# Patient Record
Sex: MALE | Race: OTHER | Employment: UNEMPLOYED | URBAN - METROPOLITAN AREA
[De-identification: names, ages, dates, MRNs, and addresses within clinical notes are randomized per-mention and may not be internally consistent; named-entity substitution may affect disease eponyms.]

---

## 2017-07-16 ENCOUNTER — HOSPITAL ENCOUNTER (EMERGENCY)
Age: 22
Discharge: HOME | End: 2017-07-16
Attending: EMERGENCY MEDICINE
Payer: SELF-PAY

## 2017-07-16 ENCOUNTER — APPOINTMENT (OUTPATIENT)
Dept: GENERAL RADIOLOGY | Age: 22
End: 2017-07-16
Attending: EMERGENCY MEDICINE
Payer: SELF-PAY

## 2017-07-16 VITALS
DIASTOLIC BLOOD PRESSURE: 101 MMHG | HEIGHT: 64 IN | BODY MASS INDEX: 19.97 KG/M2 | SYSTOLIC BLOOD PRESSURE: 129 MMHG | HEART RATE: 59 BPM | WEIGHT: 117 LBS | OXYGEN SATURATION: 99 % | RESPIRATION RATE: 14 BRPM

## 2017-07-16 DIAGNOSIS — K59.00 CONSTIPATION, UNSPECIFIED CONSTIPATION TYPE: Primary | ICD-10-CM

## 2017-07-16 LAB
ALBUMIN SERPL BCP-MCNC: 4.2 G/DL (ref 3.5–5)
ALBUMIN/GLOB SERPL: 1.3 {RATIO} (ref 1.1–2.2)
ALP SERPL-CCNC: 66 U/L (ref 45–117)
ALT SERPL-CCNC: 21 U/L (ref 12–78)
ANION GAP BLD CALC-SCNC: 6 MMOL/L (ref 5–15)
APPEARANCE UR: CLEAR
AST SERPL W P-5'-P-CCNC: 20 U/L (ref 15–37)
BACTERIA URNS QL MICRO: NEGATIVE /HPF
BASOPHILS # BLD AUTO: 0 K/UL (ref 0–0.1)
BASOPHILS # BLD: 0 % (ref 0–1)
BILIRUB SERPL-MCNC: 1.3 MG/DL (ref 0.2–1)
BILIRUB UR QL: NEGATIVE
BUN SERPL-MCNC: 12 MG/DL (ref 6–20)
BUN/CREAT SERPL: 15 (ref 12–20)
CALCIUM SERPL-MCNC: 8.5 MG/DL (ref 8.5–10.1)
CHLORIDE SERPL-SCNC: 104 MMOL/L (ref 97–108)
CO2 SERPL-SCNC: 28 MMOL/L (ref 21–32)
COLOR UR: NORMAL
CREAT SERPL-MCNC: 0.79 MG/DL (ref 0.7–1.3)
EOSINOPHIL # BLD: 0.1 K/UL (ref 0–0.4)
EOSINOPHIL NFR BLD: 1 % (ref 0–7)
EPITH CASTS URNS QL MICRO: NORMAL /LPF
ERYTHROCYTE [DISTWIDTH] IN BLOOD BY AUTOMATED COUNT: 12.7 % (ref 11.5–14.5)
GLOBULIN SER CALC-MCNC: 3.3 G/DL (ref 2–4)
GLUCOSE SERPL-MCNC: 85 MG/DL (ref 65–100)
GLUCOSE UR STRIP.AUTO-MCNC: NEGATIVE MG/DL
HCT VFR BLD AUTO: 44.3 % (ref 36.6–50.3)
HGB BLD-MCNC: 15.3 G/DL (ref 12.1–17)
HGB UR QL STRIP: NEGATIVE
HYALINE CASTS URNS QL MICRO: NORMAL /LPF (ref 0–5)
KETONES UR QL STRIP.AUTO: NEGATIVE MG/DL
LEUKOCYTE ESTERASE UR QL STRIP.AUTO: NEGATIVE
LIPASE SERPL-CCNC: 106 U/L (ref 73–393)
LYMPHOCYTES # BLD AUTO: 32 % (ref 12–49)
LYMPHOCYTES # BLD: 1.8 K/UL (ref 0.8–3.5)
MCH RBC QN AUTO: 30.2 PG (ref 26–34)
MCHC RBC AUTO-ENTMCNC: 34.5 G/DL (ref 30–36.5)
MCV RBC AUTO: 87.5 FL (ref 80–99)
MONOCYTES # BLD: 0.4 K/UL (ref 0–1)
MONOCYTES NFR BLD AUTO: 7 % (ref 5–13)
NEUTS SEG # BLD: 3.3 K/UL (ref 1.8–8)
NEUTS SEG NFR BLD AUTO: 60 % (ref 32–75)
NITRITE UR QL STRIP.AUTO: NEGATIVE
PH UR STRIP: 7 [PH] (ref 5–8)
PLATELET # BLD AUTO: 193 K/UL (ref 150–400)
POTASSIUM SERPL-SCNC: 4 MMOL/L (ref 3.5–5.1)
PROT SERPL-MCNC: 7.5 G/DL (ref 6.4–8.2)
PROT UR STRIP-MCNC: NEGATIVE MG/DL
RBC # BLD AUTO: 5.06 M/UL (ref 4.1–5.7)
RBC #/AREA URNS HPF: NORMAL /HPF (ref 0–5)
SODIUM SERPL-SCNC: 138 MMOL/L (ref 136–145)
SP GR UR REFRACTOMETRY: 1.02 (ref 1–1.03)
UROBILINOGEN UR QL STRIP.AUTO: 0.2 EU/DL (ref 0.2–1)
WBC # BLD AUTO: 5.5 K/UL (ref 4.1–11.1)
WBC URNS QL MICRO: NORMAL /HPF (ref 0–4)

## 2017-07-16 PROCEDURE — 83690 ASSAY OF LIPASE: CPT | Performed by: EMERGENCY MEDICINE

## 2017-07-16 PROCEDURE — 85025 COMPLETE CBC W/AUTO DIFF WBC: CPT | Performed by: EMERGENCY MEDICINE

## 2017-07-16 PROCEDURE — 36415 COLL VENOUS BLD VENIPUNCTURE: CPT | Performed by: EMERGENCY MEDICINE

## 2017-07-16 PROCEDURE — 74022 RADEX COMPL AQT ABD SERIES: CPT

## 2017-07-16 PROCEDURE — 81001 URINALYSIS AUTO W/SCOPE: CPT | Performed by: EMERGENCY MEDICINE

## 2017-07-16 PROCEDURE — 80053 COMPREHEN METABOLIC PANEL: CPT | Performed by: EMERGENCY MEDICINE

## 2017-07-16 PROCEDURE — 99283 EMERGENCY DEPT VISIT LOW MDM: CPT

## 2017-07-16 RX ORDER — DOCUSATE SODIUM 100 MG/1
100 CAPSULE, LIQUID FILLED ORAL 2 TIMES DAILY
Qty: 60 CAP | Refills: 0 | Status: SHIPPED | OUTPATIENT
Start: 2017-07-16 | End: 2017-08-15

## 2017-07-16 RX ORDER — MAGNESIUM CITRATE
296 SOLUTION, ORAL ORAL
Qty: 1 BOTTLE | Refills: 0 | Status: SHIPPED | OUTPATIENT
Start: 2017-07-16 | End: 2017-07-16

## 2017-07-16 NOTE — DISCHARGE INSTRUCTIONS
We hope that we have addressed all of your medical concerns. The examination and treatment you received in the Emergency Department were for an emergent problem and were not intended as complete care. It is important that you follow up with your healthcare provider(s) for ongoing care. If your symptoms worsen or do not improve as expected, and you are unable to reach your usual health care provider(s), you should return to the Emergency Department. Today's healthcare is undergoing tremendous change, and patient satisfaction surveys are one of the many tools to assess the quality of medical care. You may receive a survey from the [employer] organization regarding your experience in the Emergency Department. I hope that your experience has been completely positive, particularly the medical care that I provided. As such, please participate in the survey; anything less than excellent does not meet my expectations or intentions. [facility] and [facility] participate in nationally recognized quality of care measures. If your blood pressure is greater than 120/80, as reported below, we urge that you seek medical care to address the potential of high blood pressure, commonly known as hypertension. Hypertension can be hereditary or can be caused by certain medical conditions, pain, stress, or \"white coat syndrome. \"       Please make an appointment with your health care provider(s) for follow up of your Emergency Department visit. VITALS:   Patient Vitals for the past 8 hrs:   Pulse Resp BP SpO2   07/16/17 1154 (!) 59 14 136/89 99 %          Thank you for allowing us to provide you with medical care today. We realize that you have many choices for your emergency care needs. Please choose us in the future for any continued health care needs.       Elly Franco MD    [facility]   Office: [phone]            Recent Results (from the past 24 hour(s))   CBC WITH AUTOMATED DIFF    Collection Time: 07/16/17 12:52 PM   Result Value Ref Range    WBC 5.5 4.1 - 11.1 K/uL    RBC 5.06 4.10 - 5.70 M/uL    HGB 15.3 12.1 - 17.0 g/dL    HCT 44.3 36.6 - 50.3 %    MCV 87.5 80.0 - 99.0 FL    MCH 30.2 26.0 - 34.0 PG    MCHC 34.5 30.0 - 36.5 g/dL    RDW 12.7 11.5 - 14.5 %    PLATELET 408 985 - 106 K/uL    NEUTROPHILS 60 32 - 75 %    LYMPHOCYTES 32 12 - 49 %    MONOCYTES 7 5 - 13 %    EOSINOPHILS 1 0 - 7 %    BASOPHILS 0 0 - 1 %    ABS. NEUTROPHILS 3.3 1.8 - 8.0 K/UL    ABS. LYMPHOCYTES 1.8 0.8 - 3.5 K/UL    ABS. MONOCYTES 0.4 0.0 - 1.0 K/UL    ABS. EOSINOPHILS 0.1 0.0 - 0.4 K/UL    ABS. BASOPHILS 0.0 0.0 - 0.1 K/UL   METABOLIC PANEL, COMPREHENSIVE    Collection Time: 07/16/17 12:52 PM   Result Value Ref Range    Sodium 138 136 - 145 mmol/L    Potassium 4.0 3.5 - 5.1 mmol/L    Chloride 104 97 - 108 mmol/L    CO2 28 21 - 32 mmol/L    Anion gap 6 5 - 15 mmol/L    Glucose 85 65 - 100 mg/dL    BUN 12 6 - 20 MG/DL    Creatinine 0.79 0.70 - 1.30 MG/DL    BUN/Creatinine ratio 15 12 - 20      GFR est AA >60 >60 ml/min/1.73m2    GFR est non-AA >60 >60 ml/min/1.73m2    Calcium 8.5 8.5 - 10.1 MG/DL    Bilirubin, total 1.3 (H) 0.2 - 1.0 MG/DL    ALT (SGPT) 21 12 - 78 U/L    AST (SGOT) 20 15 - 37 U/L    Alk.  phosphatase 66 45 - 117 U/L    Protein, total 7.5 6.4 - 8.2 g/dL    Albumin 4.2 3.5 - 5.0 g/dL    Globulin 3.3 2.0 - 4.0 g/dL    A-G Ratio 1.3 1.1 - 2.2     LIPASE    Collection Time: 07/16/17 12:52 PM   Result Value Ref Range    Lipase 106 73 - 393 U/L   URINALYSIS W/MICROSCOPIC    Collection Time: 07/16/17 12:52 PM   Result Value Ref Range    Color YELLOW/STRAW      Appearance CLEAR CLEAR      Specific gravity 1.025 1.003 - 1.030      pH (UA) 7.0 5.0 - 8.0      Protein NEGATIVE  NEG mg/dL    Glucose NEGATIVE  NEG mg/dL    Ketone NEGATIVE  NEG mg/dL    Bilirubin NEGATIVE  NEG      Blood NEGATIVE  NEG      Urobilinogen 0.2 0.2 - 1.0 EU/dL    Nitrites NEGATIVE  NEG      Leukocyte Esterase NEGATIVE  NEG      WBC 0-4 0 - 4 /hpf    RBC 0-5 0 - 5 /hpf    Epithelial cells FEW FEW /lpf    Bacteria NEGATIVE  NEG /hpf    Hyaline cast 0-2 0 - 5 /lpf       Xr Abd Acute W 1 V Chest    Result Date: 7/16/2017  EXAM:  XR ABD ACUTE W 1 V CHEST INDICATION: Left lower quadrant abdominal pain and palpable masses. COMPARISON: None. TECHNIQUE: Erect chest and abdomen and supine abdomen radiographs (acute abdominal series). FINDINGS: The cardiomediastinal and hilar contours are within normal limits. The lungs and pleural spaces are clear. No free air under the diaphragm. No bowel obstruction. Mild colonic fecal stasis. No evidence of free intraperitoneal air. No calcification projected over the kidneys. The osseous structures are age appropriate. IMPRESSION: No bowel obstruction. Mild colonic fecal stasis. Estreñimiento: Instrucciones de cuidado - [ Constipation: Care Instructions ]  Instrucciones de cuidado  Tener estreñimiento significa que usted tiene dificultades para eliminar las heces (evacuaciones del intestino). Las personas eliminan heces entre 3 veces al día y Faustina Quarry vez cada 3 días. Lo que es normal para usted puede ser [employer]. El estreñimiento puede ocurrir con dolor en el recto y cólicos. El dolor podría empeorar cuando trata de eliminar las heces. A veces hay pequeñas cantidades de jorge jerome viva en el papel higiénico o en la superficie de las heces. [place] se debe a las venas dilatadas cerca del recto (hemorroides). Algunos cambios en chung Mariana Folk y estilo de charlene podrían ayudarle a evitar el estreñimiento continuo. Es posible que el médico además le recete medicamentos para ayudar a aflojar las heces. Algunos medicamentos pueden causar estreñimiento. [place] incluye los analgésicos (medicamentos para el dolor) y los antidepresivos. Infórmele a chung médico sobre Ramila Peterson que usted nay.  Es posible que chung Sharia Locket cambiar un medicamento para aliviar grady síntomas. La atención de seguimiento es clarice parte clave de chung tratamiento y seguridad. Asegúrese de hacer y acudir a todas las citas, y llame a chung médico si está teniendo problemas. También es clarice buena idea saber los resultados de los exámenes y mantener clarice lista de los medicamentos que nay. ¿Cómo puede cuidarse en el hogar? · Chetna abundantes líquidos, los suficientes keisha para que chung orina sea de color amarillo dinora o transparente keisha el agua. Si tiene [employer], del corazón o del hígado y tiene que Yoel's líquidos, hable con chung médico antes de aumentar chung consumo. · Incluya en chung dieta diaria alimentos ricos en fibra. Estos incluyen frutas, verduras, frijoles (habichuelas) y granos integrales. · Erik por lo menos 30 minutos de ejercicio la mayoría de los días de la [place]. Caminar es clarice buena opción. Es posible que también quiera hacer otras actividades, keisha correr, nadar, [employer], o jugar al tenis u otros deportes de equipo. · Tangerine un suplemento de [place], keisha Citrucel o Metamucil, todos los GRASSE. Janae y siga todas las indicaciones de la [place]. · Programe tiempo todos los días para evacuar el intestino. Miranda Michelee temitope diaria podría ayudar. Tómese chung tiempo para evacuar el intestino. · Apoye los pies sobre un banco o taburete pequeño cuando se siente en el inodoro. [place] ayuda a flexionar las caderas y coloca la pelvis en posición de cuclillas. · Chung médico podría recomendarle un laxante de venta senait para aliviar el estreñimiento. Bola Curls son Lennart [place] de Magnesia (Milk of Magnesia) y [place]. Janae y siga todas las instrucciones de la [place]. No use laxantes de [employer]. ¿Cuándo debe pedir ayuda? Llame a chung médico ahora mismo o busque atención médica inmediata si:  · Tiene dolor abdominal nuevo o peor. · Tiene náuseas o vómito nuevos o peores. · Tiene jorge en las heces.   Preste especial atención a los cambios en chung graciela y asegúrese de comunicarse con chung médico si:  · Chung estreñimiento empeora. · No mejora keisha se esperaba. ¿Dónde puede encontrar más información en inglés? John Constantine a http://mao-ailyn.info/. Escriba P343 en la búsqueda para aprender Diya Rust de \"Estreñimiento: Instrucciones de cuidado - [ Constipation: Care Instructions ]. \"  Revisado: 20 marzo, 2017  Versión del contenido: 11.3  © 1298-5070 Healthwise, Incorporated. Las instrucciones de cuidado fueron adaptadas bajo licencia por [employer] (which disclaims liability or warranty for this information). Si usted tiene [place] afección médica o sobre estas instrucciones, siempre pregunte a chung profesional de garciela. Healthwise, Incorporated niega toda garantía o responsabilidad por chung uso de esta información.

## 2017-07-16 NOTE — ED TRIAGE NOTES
Pt states he has LLQ pain and masses. Was seen here in November and was eval and referred to cross over or car-a-van and did not follow up. Pt denies n/v/d or constipation. Pt is Greek speaking.

## 2017-07-16 NOTE — ED PROVIDER NOTES
HPI Comments: 23 y.o. male with no significant past medical history who presents from home with chief complaint of abdominal pain. Pt reports he has constantly felt \"a ball\" with occasional 8/10 pain in his abdominal LLQ for 1 year accompanied by intermittent mild nausea. He states his last BM was 2 days ago, but he does not have regular BMs daily. Pt notes he was seen here 6 months ago for similar sx and was prescribed Bentyl with instructions to follow up w/ [facility] or [facility]. He states he had no relief from Bentyl at that time, and he did not follow up. Pt denies current nausea. Pt denies hx of abdominal surgery and FMHx of intestinal disease. Pt denies bloody stool, vomiting, dysuria, fever, diarrhea, and weight changes. There are no other acute medical concerns at this time. Note written by Stephane Ron, as dictated by Jyothi Ojeda MD 12:04 PM    The history is provided by the patient. History reviewed. No pertinent past medical history. History reviewed. No pertinent surgical history. History reviewed. No pertinent family history. Social History     Social History    Marital status: SINGLE     Spouse name: N/A    Number of children: N/A    Years of education: N/A     Occupational History    Not on file. Social History Main Topics    Smoking status: Never Smoker    Smokeless tobacco: Never Used    Alcohol use No    Drug use: No    Sexual activity: Not on file     Other Topics Concern    Not on file     Social History Narrative         ALLERGIES: Review of patient's allergies indicates no known allergies. Review of Systems   Constitutional: Negative for fever and unexpected weight change. Gastrointestinal: Positive for abdominal pain and nausea. Negative for blood in stool, diarrhea and vomiting. Genitourinary: Negative for dysuria. All other systems reviewed and are negative.       Vitals:    07/16/17 1154   BP: 136/89   Pulse: (!) 59   Resp: 14   SpO2: 99%   Weight: 53.1 kg (117 lb)   Height: 5' 4.17\" (1.63 m)            Physical Exam   Constitutional: He is oriented to person, place, and time. He appears well-developed and well-nourished. No distress. HENT:   Head: Normocephalic and atraumatic. Eyes: Conjunctivae are normal.   Neck: Normal range of motion. Cardiovascular: Normal rate, regular rhythm, normal heart sounds and intact distal pulses. Exam reveals no friction rub. No murmur heard. Pulmonary/Chest: Effort normal and breath sounds normal. No respiratory distress. He has no wheezes. He has no rales. He exhibits no tenderness. Abdominal: Soft. Bowel sounds are normal. He exhibits no distension. There is tenderness. There is no rebound and no guarding. Mild llq ttp   Musculoskeletal: Normal range of motion. Neurological: He is alert and oriented to person, place, and time. Coordination normal.   Skin: Skin is warm and dry. He is not diaphoretic. No pallor. Psychiatric: He has a normal mood and affect. His behavior is normal.   Nursing note and vitals reviewed. MDM  Number of Diagnoses or Management Options  Constipation, unspecified constipation type:   Diagnosis management comments: Sounds like constipation no episodes of diarrhea no weight loss. Will check labs and xray. Doubt pancreatitis but can cause llq pain       Amount and/or Complexity of Data Reviewed  Clinical lab tests: ordered and reviewed  Tests in the radiology section of CPT®: ordered and reviewed    Patient Progress  Patient progress: stable    ED Course       Procedures  2:16 PM  Pt with constipation on xray. Hx of same suspect that is issue. Discussed need for increasing fiber in diet and stool softeners. Also discussed possibility of colonoscopy if sx persist and gave resources.  Pt and friend understand plan and will return if worsening sx

## 2017-07-27 ENCOUNTER — OFFICE VISIT (OUTPATIENT)
Dept: FAMILY MEDICINE CLINIC | Age: 22
End: 2017-07-27

## 2017-07-27 VITALS
BODY MASS INDEX: 19.97 KG/M2 | SYSTOLIC BLOOD PRESSURE: 147 MMHG | HEART RATE: 64 BPM | HEIGHT: 64 IN | TEMPERATURE: 98.4 F | DIASTOLIC BLOOD PRESSURE: 93 MMHG | WEIGHT: 117 LBS

## 2017-07-27 DIAGNOSIS — S39.011A STRAIN OF RECTUS ABDOMINIS MUSCLE, INITIAL ENCOUNTER: Primary | ICD-10-CM

## 2017-07-27 DIAGNOSIS — K21.9 GASTROESOPHAGEAL REFLUX DISEASE WITHOUT ESOPHAGITIS: ICD-10-CM

## 2017-07-27 RX ORDER — RANITIDINE 150 MG/1
150 TABLET, FILM COATED ORAL 2 TIMES DAILY
Qty: 60 TAB | Refills: 2 | Status: SHIPPED | OUTPATIENT
Start: 2017-07-27

## 2017-07-27 NOTE — PATIENT INSTRUCTIONS
Enfermedad de reflujo gastroesofágico (GERD): Instrucciones de cuidado - [ Gastroesophageal Reflux Disease (GERD): Care Instructions ]  Instrucciones de cuidado    La enfermedad de reflujo gastroesofágico (GERD, por grady siglas en inglés) es cuando el ácido estomacal se devuelve hacia el esófago. El esófago es el conducto que va de la garganta al [facility]. Clarice válvula de clarice regan vía hillary que el ácido del estómago se devuelva por janie conducto. Cuando usted tiene GERD, esta válvula no se catia lo suficientemente firme. Si usted tiene síntomas leves de GERD, keisha acidez estomacal, es posible que pueda controlar el problema con antiácidos o medicamentos de [employer]. Cambiar la alimentación, bajar de peso y hacer otros cambios en el estilo de charlene también pueden ayudar a reducir los síntomas. La atención de seguimiento es clarice parte clave de chung tratamiento y seguridad. Asegúrese de hacer y acudir a todas las citas, y llame a chung médico si está teniendo problemas. También es clarice buena idea saber los resultados de los exámenes y mantener clarice lista de los medicamentos que nay. ¿Cómo puede cuidarse en el hogar? · [place] medicamentos exactamente keisha le fueron recetados. Llame a chung médico si corie estar teniendo problemas con chung medicamento. · Chung médico le podría recomendar medicamentos de [employer]. Para la indigestión leve u ocasional pueden servirle los antiácidos keisha Tums, Gaviscon, Mylanta o Maalox. Chung médico también podría recomendar reductores de ácido de venta senait, keisha Pepcid AC, Tagamet HB, Zantac 75 o Prilosec. Janae y siga todas las instrucciones de la [place]. Si [place] estos medicamentos con frecuencia, hable con chung médico.  · Cambie grady hábitos alimenticios. ¨ Es mejor comer varias comidas pequeñas en lugar de dos o sylvie comidas abundantes. ¨ Después de comer, espere de 2 a 3 horas antes de recostarse. ¨ El chocolate, la menta y el alcohol pueden empeorar la GERD.   ¨ En [employer], los alimentos condimentados, los alimentos que tienen mucho ácido (keisha los tomates y las naranjas) y el café pueden empeorar los síntomas de la GERD. Si emilia síntomas empeoran después de comer un determinado alimento, se recomienda que deje de comer dimitri alimento para estephania si emilia síntomas mejoran. · No fume ni masque tabaco. Fumar puede agravar la GERD. Si necesita ayuda para dejar de usar tabaco, hable con chung médico [employer] y medicamentos para dejar de usarlo. Éstos pueden aumentar emilia probabilidades de dejar el hábito para siempre. · Si tiene síntomas de GERD deepa la noche, eleve la cabecera de chung cama entre 6 y 6 pulgadas (entre 15 y 20 cm) colocando ladrillos debajo del lauren de la cama o clarice cuña de espuma debajo de la cabecera del colchón. (Usar almohadas adicionales no funciona). · No use ropa que le ajuste mucho la parte media del cuerpo. · Baje de peso, si necesita hacerlo. Perder sólo de 5 a 10 libras (2.3 a 4.5 kg) puede ayudarle. ¿Cuándo debe pedir ayuda? Llame a chung médico ahora mismo o busque atención médica inmediata si:  · Tiene un dolor de estómago nuevo o distinto. · Emilia heces son negruzcas y parecidas al alquitrán o tienen rastros de [place]. Preste especial atención a los cambios en chung graciela y asegúrese de comunicarse con chung médico si:  · Emilia síntomas no gu cici después de 2 días. · La comida parece quedarle atorada en la garganta o el pecho. ¿Dónde puede encontrar más información en inglés? David Marker a http://mao-ailyn.info/. Christiana Otto C418 en la búsqueda para aprender más acerca de \"Enfermedad de reflujo gastroesofágico (GERD): Instrucciones de cuidado - [ Gastroesophageal Reflux Disease (GERD): Care Instructions ]. \"  Revisado: 9 agosto, 2016  Versión del contenido: 11.3  © 4741-1762 [employer], [place]. Las instrucciones de cuidado fueron adaptadas bajo licencia por [employer] (which disclaims liability or warranty for this information). Si usted tiene [place] afección médica o sobre estas instrucciones, siempre pregunte a chung profesional de graciela. Healthwise, Incorporated niega toda garantía o responsabilidad por chung uso de esta información. Distensión abdominal: Ejercicios de rehabilitación - [ Abdominal Strain: Rehab Exercises ]  Instrucciones de cuidado  Estos son algunos ejemplos de ejercicios típicos de rehabilitación para chung afección. Comience cada ejercicio lentamente. Reduzca la intensidad del ejercicio si Nickolas Alu a sentir dolor. Chung médico o el fisioterapeuta le dirán cuándo puede comenzar con estos ejercicios y cuáles funcionarán mejor para usted. Cómo se hacen los ejercicios  Metida del abdomen    1. Acuéstese boca arriba con las rodillas flexionadas. Ponga dos dedos salma dentro de los huesos de la cadera de manera que pueda sentir los músculos de la parte baja del abdomen. 2. Inspire profundamente. 3. A medida que ron salir el aire, jale el ombligo hacia la columna, keisha si tratara de subir la cremallera de unos pantalones ajustados. Usted deberá sentir que los músculos de la parte baja del abdomen se alejan de grady dedos a medida que los músculos se tensan. 4. Mantenga por aproximadamente 6 segundos, lisbeth no contenga la respiración. 5. Relájese deepa un yaakov de 10 segundos. 6. Repita de 8 a 12 veces. 7. Repita janie ejercicio varias veces al día y trate de [employer] músculos de la parte baja del abdomen adentro cada vez por más tiempo, conforme se Latrelle Axon fortaleciendo. 8. Practique janie ejercicio mientras está de pie, keisha cuando espera en clarice kandy o cuando está sentado. Inclinación de la pelvis    1. Acuéstese boca arriba con las rodillas flexionadas. 2. \"Afirme\" chung estómago: apriete los músculos hundiéndolos e imaginando que chung ombligo se desplaza hacia la columna vertebral.  3. Erik presión hacia el piso con la parte baja de la espalda.  Debe sentir que grady caderas y pelvis se balancean hacia atrás.  4. Mantenga la posición deepa 6 segundos mientras respira de Ema pausada. 5. Relájese y deje que chung pelvis y caderas se balanceen hacia adelante. 6. Repita de 8 a 12 veces. Abdominales    1. Acuéstese con [place] dobladas y los brazos a los costados. Mantenga los pies apoyados sobre el piso. 2. Levante la chin y los hombros unas cuantas pulgadas. Al mismo tiempo, levante los brazos aproximadamente a la altura de los muslos. 3. Mantenga la posición deepa 6 segundos. 4. Relájese y regrese a la posición inicial.  5. Repita de 8 a 12 veces. Abdominales diagonales    1. Acuéstese con [place] dobladas y los brazos a los costados. Mantenga los pies apoyados sobre el piso. 2. Levante la chin y los hombros. Al mismo Nathaly, toque dedrick de grady costados con ambos brazos. 3. Mantenga la posición deepa 6 segundos. 4. Relájese y regrese a la posición inicial.  5. Repita de 8 a 12 veces. 6. Repita los mismos pasos del otro lado. La atención de seguimiento es clarice parte clave de chung tratamiento y seguridad. Asegúrese de hacer y acudir a todas las citas, y llame a chung médico si está teniendo problemas. También es clarice buena idea saber los resultados de los exámenes y mantener clarice lista de los medicamentos que nay. ¿Dónde puede encontrar más información en inglés? Linda Kid a http://mao-ailyn.info/. Trupti Trevino [id number] en la búsqueda para aprender más acerca de \"Distensión abdominal: Ejercicios de rehabilitación - [ Abdominal Strain: Rehab Exercises ]. \"  Revisado: 21 marzo, 2017  Versión del contenido: 11.3  © 8441-6159 [employer], [place]. Las instrucciones de cuidado fueron adaptadas bajo licencia por [employer] (which disclaims liability or warranty for this information). Si usted tiene [place] afección médica o sobre estas instrucciones, siempre pregunte a chung profesional de graciela.  [employer], navabi niega toda garantía o responsabilidad por chung uso de [employer].

## 2017-07-27 NOTE — MR AVS SNAPSHOT
Visit Information Harlingenjenny person Ramona Personal Médico Departamento Teléfono del Dep. Número de visita 7/27/2017 12:45 PM Araceli Cummings [phone] [facility]  [phone] Follow-up Instructions Return if symptoms worsen or fail to improve. Upcoming Health Maintenance Date Due DTaP/Tdap/Td series (1 - Tdap) 4/7/2014 INFLUENZA AGE 9 TO ADULT 8/1/2017 Luz Maria Lopez A partir del:  7/27/2017 No Known Allergies Vacunas actuales Genny Alt No hay ninguna vacuna archivada. No revisadas esta visita You Were Diagnosed With   
  
 [place] Strain of rectus abdominis muscle, initial encounter    -  Primary ICD-10-CM: B56.147O ICD-9-CM: 848.8 Gastroesophageal reflux disease without esophagitis     ICD-10-CM: K21.9 ICD-9-CM: 530.81 Partes vitales PS Pulso Temperatura [place] ( percentil de crecimiento) Peso (percentil de crecimiento) BMI ([facility])  
 (!) 147/93 (BP 1 Location: Left arm, BP Patient Position: Sitting) 64 98.4 °F (36.9 °C) (Oral) 5' 3.78\" (1.62 m) 117 lb (53.1 kg) 20.22 kg/m2 Estatus de tabaquísmo Never Smoker Historial de signos vitales BMI and BSA Data Body Mass Index Body Surface Area  
 20.22 kg/m 2 1.55 m 2 [facility] Name Phone [address] [place] lista de medicamentos actualizada Lista actualizada el: 7/27/17  1:51 PM.  Verónica Jenny use rivera lista de medicamentos más reciente. raNITIdine 150 mg tablet También conocido keisha:  ZANTAC Take 1 Tab by mouth two (2) times a day. [place] 1 [employer] al sincere Recetas Enviado a la [place] Refills  
 raNITIdine (ZANTAC) 150 mg tablet 2 Sig: Take 1 Tab by mouth two (2) times a day. [place] 1 [employer] al sincere Class: Normal  
 Pharmacy: [facility] [address], [address] #: [phone] Route: Oral  
  
Instrucciones de seguimiento Return if symptoms worsen or fail to improve. Instrucciones para el Paciente Enfermedad de reflujo gastroesofágico (GERD): Instrucciones de cuidado - [ Gastroesophageal Reflux Disease (GERD): Care Instructions ] Instrucciones de cuidado La enfermedad de reflujo gastroesofágico (GERD, por grady siglas en inglés) es cuando el ácido estomacal se devuelve hacia el esófago. El esófago es el conducto que va de la garganta al [facility]. Clarice válvula de clarice regan vía hillary que el ácido del estómago se devuelva por janie conducto. Cuando usted tiene GERD, esta válvula no se catia lo suficientemente firme. Si usted tiene síntomas leves de GERD, keisha acidez estomacal, es posible que pueda controlar el problema con antiácidos o medicamentos de [employer]. Cambiar la alimentación, bajar de peso y hacer otros cambios en el estilo de charlene también pueden ayudar a reducir los síntomas. La atención de seguimiento es clarice parte clave de rivera tratamiento y seguridad. Asegúrese de hacer y acudir a todas las citas, y llame a rivera médico si está teniendo problemas. También es clarice buena idea saber los resultados de los exámenes y mantener clarice lista de los medicamentos que nay. Cómo puede cuidarse en el hogar? · [employer] medicamentos exactamente keisha le fueron recetados. Llame a rivera médico si corie estar teniendo problemas con rivera medicamento. · Rivera médico le podría recomendar medicamentos de [employer]. Para la indigestión leve u ocasional pueden servirle los antiácidos keisha Tums, Gaviscon, Mylanta o Maalox. Rivera médico también podría recomendar reductores de ácido de venta senait, keisha Pepcid AC, Tagamet HB, Zantac 75 o Prilosec. Janae y siga todas las instrucciones de la [place].  Si [place] estos medicamentos con frecuencia, hable con rivera médico. 
 · Cambie emilia hábitos alimenticios. ¨ Es mejor comer varias comidas pequeñas en lugar de dos o sylvie comidas abundantes. ¨ Después de comer, espere de 2 a 3 horas antes de recostarse. ¨ El chocolate, la menta y el alcohol pueden empeorar la GERD. ¨ En [employer], los alimentos condimentados, los alimentos que tienen mucho ácido (keisha los tomates y las naranjas) y el café pueden empeorar los síntomas de la GERD. Si emilia síntomas empeoran después de comer un determinado alimento, se recomienda que deje de comer dimitri alimento para estephania si emilia síntomas mejoran. · No fume ni masque tabaco. Fumar puede agravar la GERD. Si necesita ayuda para dejar de usar tabaco, hable con chung médico [employer] y medicamentos para dejar de usarlo. Éstos pueden aumentar emilia probabilidades de dejar el hábito para siempre. · Si tiene síntomas de GERD deepa la noche, eleve la cabecera de chung cama entre 6 y 6 pulgadas (entre 15 y 20 cm) colocando ladrillos debajo del lauren de la cama o clarice cuña de espuma debajo de la cabecera del colchón. (Usar almohadas adicionales no funciona). · No use ropa que le ajuste mucho la parte media del cuerpo. · Baje de peso, si necesita hacerlo. Perder sólo de 5 a 10 libras (2.3 a 4.5 kg) puede ayudarle. Cuándo debe pedir ayuda? Llame a chung médico ahora mismo o busque atención médica inmediata si: · Tiene un dolor de estómago nuevo o distinto. · Emilia heces son negruzcas y parecidas al alquitrán o tienen rastros de Jeronimo mitchell. Preste especial atención a los cambios en chung graciela y asegúrese de comunicarse con chung médico si: 
· Emilia síntomas no gu cici después de 2 días. · La comida parece quedarle atorada en la garganta o el pecho. Dónde puede encontrar más información en inglés? Robert File a http://mao-ailyn.info/. Danita Landeros [id number] en la búsqueda para aprender más acerca de \"Enfermedad de reflujo gastroesofágico (GERD):  Instrucciones de cuidado - [ Gastroesophageal Reflux Disease (GERD): Care Instructions ]. \" 
Revisado: 9 agosto, 2016 Versión del contenido: 11.3 © 6780-1995 Healthwise, Incorporated. Las instrucciones de cuidado fueron adaptadas bajo licencia por [employer] (which disclaims liability or warranty for this information). Si usted tiene [place] afección médica o sobre estas instrucciones, siempre pregunte a chung profesional de graciela. Healthwise, Incorporated niega toda garantía o responsabilidad por chung uso de esta información. Distensión abdominal: Ejercicios de rehabilitación - [ Abdominal Strain: Rehab Exercises ] Instrucciones de cuidado Estos son algunos ejemplos de ejercicios típicos de rehabilitación para chung afección. Comience cada ejercicio lentamente. Reduzca la intensidad del ejercicio si Carley Calamity a sentir dolor. Chung médico o el fisioterapeuta le dirán cuándo puede comenzar con estos ejercicios y cuáles funcionarán mejor para usted. Cómo se hacen los ejercicios Metida del abdomen 1. Acuéstese boca arriba con las rodillas flexionadas. Ponga dos dedos salma dentro de los huesos de la cadera de manera que pueda sentir los músculos de la parte baja del abdomen. 2. Inspire profundamente. 3. A medida que ron salir el aire, jale el ombligo hacia la columna, keisha si tratara de subir la cremallera de unos pantalones ajustados. Usted deberá sentir que los músculos de la parte baja del abdomen se alejan de grady dedos a medida que los músculos se tensan. 4. Mantenga por aproximadamente 6 segundos, lisbeth no contenga la respiración. 5. Relájese deepa un yaakov de 10 segundos. 6. Repita de 8 a 12 veces. 7. Repita janie ejercicio varias veces al día y trate de [employer] músculos de la parte baja del abdomen adentro cada vez por más tiempo, conforme se Artie Luis fortaleciendo. 8. Practique janie ejercicio mientras está de pie, keisha cuando espera en clarice kandy o cuando está sentado. Inclinación de la pelvis 1. Acuéstese boca arriba con las rodillas flexionadas. 2. \"Afirme\" chung estómago: apriete los músculos hundiéndolos e imaginando que chung ombligo se desplaza hacia la columna vertebral. 
3. Erik presión hacia el piso con la parte baja de la espalda. Debe sentir que grady caderas y pelvis se balancean hacia atrás. 4. Mantenga la posición deepa 6 segundos mientras respira de Ema pausada. 5. Relájese y deje que chung pelvis y caderas se balanceen hacia adelante. 6. Repita de 8 a 12 veces. Abdominales 1. Acuéstese con Camryn Gravely dobladas y los brazos a los costados. Mantenga los pies apoyados sobre el piso. 2. Levante la chin y los hombros unas cuantas pulgadas. Al mismo tiempo, levante los brazos aproximadamente a la altura de los muslos. 3. Mantenga la posición deepa 6 segundos. 4. Relájese y regrese a la posición inicial. 
5. Repita de 8 a 12 veces. Abdominales diagonales 1. Acuéstese con Camryn Gravely dobladas y los brazos a los costados. Mantenga los pies apoyados sobre el piso. 2. Levante la chin y los hombros. Al mismo Lowes, toque dedrick de grady costados con ambos brazos. 3. Mantenga la posición deepa 6 segundos. 4. Relájese y regrese a la posición inicial. 
5. Repita de 8 a 12 veces. 6. Repita los mismos pasos del otro lado. La atención de seguimiento es clarice parte clave de chung tratamiento y seguridad. Asegúrese de hacer y acudir a todas las citas, y llame a chung médico si está teniendo problemas. También es clarice buena idea saber los resultados de los exámenes y mantener clarice lista de los medicamentos que nay. Dónde puede encontrar más información en inglés? Bob Dunn a [email or web address]-ailyn.info/. Tess Daniel [id number] en la búsqueda para aprender más acerca de \"Distensión abdominal: Ejercicios de rehabilitación - [ Abdominal Strain: Rehab Exercises ]. \" 
Revisado: 21 marzo, 2017 Versión del contenido: 11.3 © 6144-8047 [employer], Incorporated.  Las instrucciones de cuidado fueron adaptadas bajo licencia por [employer] (which disclaims liability or warranty for this information). Si usted tiene [place] afección médica o sobre estas instrucciones, siempre pregunte a chung profesional de graciela. [facility] niega toda garantía o responsabilidad por chung uso de esta información. Introducing [facility]! [facility] introduce portal paciente MyChart . Ahora se puede acceder a partes de chung expediente médico, enviar por correo electrónico la oficina de chung médico y solicitar renovaciones de medicamentos en línea. En chung navegador de Internet , Carolyn Crews a [email or web address] clic en el usuario por June Lone? Cristy Arenas clic aquí en la sesión Nadeem Handy. Verá la página de registro [place]. Ingrese chung código de [employer] guillermina y keisha aparece a continuación. Usted no tendrá que [employer] código después de francisco completado el proceso de registro . Si usted no se inscribe antes de la fecha de caducidad , debe solicitar un nuevo código. · MyChart Código de acceso : [id number] Expires: 10/25/2017  1:51 PM 
 
Ingresa los últimos cuatro dígitos de chung Número de Seguro Social ( xxxx ) y fecha de nacimiento ( dd / mm / aaaa ) keisha se indica y [place] clic en Enviar. Rich será llevado a la siguiente página de registro . Crear un ID MyChart . Esta será chung ID de inicio de sesión de MyChart y no puede ser [place] , por lo que pensar en clarice que es [place] y fácil de recordar . Crear clarice contraseña MyChart . ted puede cambiar chung contraseña en cualquier momento . Ingrese chung Password Reset de preguntas y [place] . Ramsay se puede utilizar en un momento posterior si usted olvida chung contraseña. Introduzca chung dirección de correo electrónico . Alison Thomason recibirá clarice notificación por correo electrónico cuando la nueva información está disponible en MyChart . Valery dhaliwal en Registrarse.  Martinez Cook estephania y descargar porciones de chung expediente Noreen Ferriso isra en el enlace de descarga del menú Resumen para descargar clarice copia portátil de chung información médica . Si tiene [employer] , por favor visite la sección de preguntas frecuentes del sitio web MyChart . Recuerde, MyChart NO es que se utilizará para las necesidades urgentes. Para emergencias médicas , llame al 911 . Ahora disponible en chung iPhone y Android ! Por favor proporcione janie resumen de la documentación de cuidado a chung próximo proveedor. If you have any questions after today's visit, please call [phone].

## 2017-07-27 NOTE — PROGRESS NOTES
Assessment/Plan:    Diagnoses and all orders for this visit:    1. Strain of rectus abdominis muscle, initial encounter  Buy an abdominal binder at the pharmacy and wear it when at work. This was written on a rx since the order does not exist in connect care    2. Gastroesophageal reflux disease without esophagitis  -     raNITIdine (ZANTAC) 150 mg tablet; Take 1 Tab by mouth two (2) times a day. [place] [employer] al sincere    Pt had other complaints that I have asked him to come back for if he would like to, there was not time to address them today. One was \"I have veins on my testicles\". Follow-up Disposition:  Return if symptoms worsen or fail to improve. BEHZAD Hinson expressed understanding of this plan. An AVS was printed and given to the patient.      ----------------------------------------------------------------------    Chief Complaint   Patient presents with    Abdominal Pain     lumps on the right side of the stomach        History of Present Illness:    One year of \"balls\" that come out bilateral lower abd when he lifts heavy objects. The \"balls\" are tender and painful. Of note, in the clinic, I had him lift a heavy chair and we could not reproduce the sxs. He does not have n/v/d. He does have epigastric pain after some foods. This epigastric pain is new, in the past few months. He is a non smoker, non drinker of ETOH  He is generally in good health. He went to the ER about 10 days ago for the same abdominal pain. He brought the discharge papers with him- his cbc and cmp were normal. His xray showed some fecal stasis and he was rx'd colace and magnesium oxide. He did not have any change in his sxs after taking the meds he states although he has the hard rx's with him so I don't know how he got the medication but he state that he took them. No past medical history on file.     Current Outpatient Prescriptions   Medication Sig Dispense Refill    raNITIdine (ZANTAC) 150 mg tablet Take 1 Tab by mouth two (2) times a day. [place] 1 pastilla dos veces al sincere 60 Tab 2    docusate sodium (COLACE) 100 mg capsule Take 1 Cap by mouth two (2) times a day for 30 days. 60 Cap 0       No Known Allergies    Social History   Substance Use Topics    Smoking status: Never Smoker    Smokeless tobacco: Never Used    Alcohol use No       No family history on file. Physical Exam:     Visit Vitals    BP (!) 147/93 (BP 1 Location: Left arm, BP Patient Position: Sitting)    Pulse 64    Temp 98.4 °F (36.9 °C) (Oral)    Ht 5' 3.78\" (1.62 m)    Wt 117 lb (53.1 kg)    BMI 20.22 kg/m2     gen looks well, anxious appearing which may explain the increased blood pressure  A&Ox3  WDWN NAD  Respirations normal and non labored  abd- flat, no masses, no redness, no scars. With resisted movement (sit ups) I can not feel any masses lower abd.  Again, we watched him lift 2 different types of objects and one was about 35 lbs and again we could not reproduce the swelling that he feels  Epigastric area is tender to palpation